# Patient Record
Sex: MALE | Race: WHITE | NOT HISPANIC OR LATINO | Employment: UNEMPLOYED | URBAN - METROPOLITAN AREA
[De-identification: names, ages, dates, MRNs, and addresses within clinical notes are randomized per-mention and may not be internally consistent; named-entity substitution may affect disease eponyms.]

---

## 2024-01-13 ENCOUNTER — APPOINTMENT (OUTPATIENT)
Dept: GENERAL RADIOLOGY | Facility: HOSPITAL | Age: 59
End: 2024-01-13
Payer: COMMERCIAL

## 2024-01-13 ENCOUNTER — HOSPITAL ENCOUNTER (EMERGENCY)
Facility: HOSPITAL | Age: 59
Discharge: HOME | End: 2024-01-13
Attending: EMERGENCY MEDICINE
Payer: COMMERCIAL

## 2024-01-13 VITALS
WEIGHT: 209.44 LBS | DIASTOLIC BLOOD PRESSURE: 73 MMHG | HEIGHT: 69 IN | SYSTOLIC BLOOD PRESSURE: 129 MMHG | BODY MASS INDEX: 31.02 KG/M2 | RESPIRATION RATE: 18 BRPM | TEMPERATURE: 98.2 F | OXYGEN SATURATION: 98 % | HEART RATE: 85 BPM

## 2024-01-13 DIAGNOSIS — R05.8 POST-VIRAL COUGH SYNDROME: ICD-10-CM

## 2024-01-13 DIAGNOSIS — H69.91 DYSFUNCTION OF RIGHT EUSTACHIAN TUBE: Primary | ICD-10-CM

## 2024-01-13 DIAGNOSIS — J02.9 SORE THROAT: ICD-10-CM

## 2024-01-13 LAB — S PYO AG THROAT QL: NEGATIVE

## 2024-01-13 PROCEDURE — 99283 EMERGENCY DEPT VISIT LOW MDM: CPT

## 2024-01-13 PROCEDURE — 71046 X-RAY EXAM CHEST 2 VIEWS: CPT

## 2024-01-13 PROCEDURE — 87081 CULTURE SCREEN ONLY: CPT | Performed by: REGISTERED NURSE

## 2024-01-13 PROCEDURE — 87880 STREP A ASSAY W/OPTIC: CPT | Performed by: REGISTERED NURSE

## 2024-01-13 RX ORDER — LISINOPRIL 20 MG/1
20 TABLET ORAL DAILY
COMMUNITY

## 2024-01-13 RX ORDER — BENZONATATE 100 MG/1
100 CAPSULE ORAL 3 TIMES DAILY PRN
Qty: 20 CAPSULE | Refills: 0 | Status: SHIPPED | OUTPATIENT
Start: 2024-01-13

## 2024-01-13 RX ORDER — PSEUDOEPHEDRINE HCL 120 MG/1
120 TABLET, FILM COATED, EXTENDED RELEASE ORAL EVERY 12 HOURS
Qty: 10 TABLET | Refills: 0 | Status: SHIPPED | OUTPATIENT
Start: 2024-01-13

## 2024-01-13 RX ORDER — IBUPROFEN 400 MG/1
800 TABLET ORAL ONCE
Status: COMPLETED | OUTPATIENT
Start: 2024-01-13 | End: 2024-01-13

## 2024-01-13 RX ORDER — HYDROCHLOROTHIAZIDE 25 MG/1
25 TABLET ORAL DAILY
COMMUNITY

## 2024-01-13 RX ORDER — BUPROPION HYDROCHLORIDE 150 MG/1
150 TABLET ORAL DAILY
COMMUNITY

## 2024-01-13 RX ADMIN — BENZOCAINE AND MENTHOL 1 EACH: 15; 3.6 LOZENGE ORAL at 22:12

## 2024-01-13 RX ADMIN — IBUPROFEN 800 MG: 400 TABLET, FILM COATED ORAL at 20:39

## 2024-01-14 NOTE — DISCHARGE INSTRUCTIONS
X-ray did not show any concerning findings today.  Your ear pain is likely due to eustachian tube dysfunction.  Take Sudafed as decongestant.  Take Tylenol Motrin as needed for pain.    You can take Tessalon Perles for your cough.  Drink plenty of fluids.  Mucinex may help with your congestion as well.    Return for any new concerns.

## 2024-01-14 NOTE — ED PROVIDER NOTES
Time: 7:57 PM EST  Date of encounter:  1/13/2024  Independent Historian/Clinical History and Information was obtained by:   Patient    History is limited by: N/A    Chief Complaint: Right ear pain, cough      History of Present Illness:  Patient is a 58 y.o. year old male who presents to the emergency department for evaluation of cough for over 2 weeks as well as right ear pain that started yesterday.  Patient reports that he was seen by his PCP in [place] 2 weeks ago and diagnosed with respiratory infection for which she was given antibiotics and steroids.  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  Patient states that he has right ear pain since yesterday.  He has been taken Tylenol and ibuprofen without significant relief of symptoms.  Patient denies nausea/vomiting, fever/chills.    HPI    Patient Care Team  Primary Care Provider: Marivel Carrington Known    Past Medical History:     No Known Allergies  Past Medical History:   Diagnosis Date    Depression     Hyperlipidemia     Hypertension      History reviewed. No pertinent surgical history.  History reviewed. No pertinent family history.    Home Medications:  Prior to Admission medications    Medication Sig Start Date End Date Taking? Authorizing Provider   buPROPion XL (WELLBUTRIN XL) 150 MG 24 hr tablet Take 1 tablet by mouth Daily.    Salas Carrington MD   Cariprazine HCl (Vraylar) 1.5 MG capsule capsule Take 1 capsule by mouth Daily.    Salas Carrington MD   hydroCHLOROthiazide (HYDRODIURIL) 25 MG tablet Take 1 tablet by mouth Daily.    Salas Carrington MD   lisinopril (PRINIVIL,ZESTRIL) 20 MG tablet Take 1 tablet by mouth Daily.    ProviderSalas MD        Social History:   Social History     Substance Use Topics    Alcohol use: Not Currently    Drug use: Not Currently         Review of Systems:  Review of Systems   Constitutional:  Negative for chills and fever.   HENT:  Positive for ear pain  "(Right). Negative for congestion and sore throat.    Eyes:  Negative for pain.   Respiratory:  Positive for cough. Negative for chest tightness and shortness of breath.    Cardiovascular:  Negative for chest pain.   Gastrointestinal:  Negative for abdominal pain, diarrhea, nausea and vomiting.   Genitourinary:  Negative for flank pain and hematuria.   Musculoskeletal:  Negative for joint swelling.   Skin:  Negative for pallor.   Neurological:  Negative for seizures and headaches.   All other systems reviewed and are negative.       Physical Exam:  /73   Pulse 85   Temp 98.2 °F (36.8 °C) (Oral)   Resp 18   Ht 175.3 cm (69\")   Wt 95 kg (209 lb 7 oz)   SpO2 98%   BMI 30.93 kg/m²     Physical Exam  Vitals and nursing note reviewed.   Constitutional:       General: He is in acute distress (Appears in pain).      Appearance: Normal appearance. He is not toxic-appearing.   HENT:      Head: Normocephalic and atraumatic.      Right Ear: Tympanic membrane, ear canal and external ear normal.      Left Ear: Tympanic membrane, ear canal and external ear normal.      Nose: Nose normal.      Mouth/Throat:      Lips: Pink.      Mouth: Mucous membranes are moist.      Pharynx: Oropharynx is clear. Uvula midline.   Eyes:      General: No scleral icterus.  Cardiovascular:      Rate and Rhythm: Normal rate and regular rhythm.      Pulses:           Radial pulses are 2+ on the right side and 2+ on the left side.      Heart sounds: Normal heart sounds.   Pulmonary:      Effort: Pulmonary effort is normal. No respiratory distress.      Breath sounds: Normal breath sounds. No wheezing or rhonchi.   Abdominal:      General: Abdomen is flat. Bowel sounds are normal.      Palpations: Abdomen is soft.      Tenderness: There is no abdominal tenderness.   Musculoskeletal:         General: Normal range of motion.      Cervical back: Normal range of motion and neck supple.   Skin:     General: Skin is warm and dry.   Neurological:     "  Mental Status: He is alert and oriented to person, place, and time. Mental status is at baseline.                Procedures:  Procedures      Medical Decision Making:      Comorbidities that affect care:    Substance Abuse    External Notes reviewed:          The following orders were placed and all results were independently analyzed by me:  Orders Placed This Encounter   Procedures    Rapid Strep A Screen - Swab, Throat    XR Chest 2 View       Medications Given in the Emergency Department:  Medications   ibuprofen (ADVIL,MOTRIN) tablet 800 mg (800 mg Oral Given 1/13/24 2039)   benzocaine-menthol (CEPACOL) lozenge 1 each (1 each Mouth/Throat Given 1/13/24 2212)        ED Course:    ED Course as of 01/13/24 2225   Sat Jan 13, 2024 2208 XR Chest 2 View  No acute changes [CW]      ED Course User Index  [CW] Mayra Chacon APRN       Labs:    Lab Results (last 24 hours)       Procedure Component Value Units Date/Time    Rapid Strep A Screen - Swab, Throat [[id number]] Collected: 01/13/24 2213    Specimen: Swab from Throat Updated: 01/13/24 2217             Imaging:    XR Chest 2 View    Result Date: 1/13/2024  PROCEDURE: XR CHEST 2 VW  COMPARISON: None.  INDICATIONS: COUGH; H/O UPPER RESPIRATORY INFECTION X 2 WEEKS; +EARACHE.  FINDINGS:  Two views of the chest reveal no cardiac enlargement and no acute infiltrate.  No pleural effusion or pneumothorax is seen.  There may be mild atelectasis and/or fibrosis in the lung bases, especially on the left.  Coronary artery calcifications are seen.  The thoracic aorta is atherosclerotic.  Degenerative changes involve the right shoulder.  There may be postoperative changes of the left shoulder.  Slightly low lung volumes are present.  There are degenerative changes of the imaged spine        No acute infiltrate is appreciated.    Please note that portions of this note were completed with a voice recognition program.  KATIE SALDANA JR, MD       Electronically Signed and  Approved By: KATIE SALDANA JR, MD on 1/13/2024 at 21:49                 Differential Diagnosis and Discussion:    Cough: Differential diagnosis includes but is not limited to pneumonia, acute bronchitis, upper respiratory infection, ACE inhibitor use, allergic reaction, epiglottitis, seasonal allergies, chemical irritants, exercise-induced asthma, viral syndrome.  Ear Pain: Differential diagnosis includes but is not limited to this externa, otitis media, foreign body, bullous myringitis, furuncles, herpes zoster, mastoiditis, trauma, and tumors    All X-rays impressions were independently interpreted by me.    MDM  Number of Diagnoses or Management Options  Dysfunction of right eustachian tube  Post-viral cough syndrome  Sore throat  Diagnosis management comments: Patient presented with cough and right ear pain.  Ear pain was improved after ibuprofen.  Prior to discharge patient complained of sore throat, will send for strep swab.  No acute findings on chest x-ray, likely postviral cough syndrome.  Patient was given Tessalon Perles for cough and Sudafed for right eustachian tube dysfunction. I do not believe that the patient has an acute emergency medical condition requiring additional emergency management at this time. The patient is currently stable for outpatient treatment and continuation of care. Important signs and symptoms that would warrant return to the emergency department were reviewed. The patient was provided the opportunity to ask questions. All questions were addressed and the patient was discharged from the ED. The patient demonstrated understanding and agreed to plan.         Amount and/or Complexity of Data Reviewed  Clinical lab tests: ordered  Tests in the radiology section of CPT®: reviewed and ordered    Risk of Complications, Morbidity, and/or Mortality  Presenting problems: low  Diagnostic procedures: minimal  Management options: minimal    Patient Progress  Patient progress: improved                  Patient Care Considerations:    ANTIBIOTICS: I considered prescribing antibiotics as an outpatient however no bacterial focus of infection was found.      Consultants/Shared Management Plan:        Social Determinants of Health:    Patient is independent, reliable, and has access to care.       Disposition and Care Coordination:    Discharged: The patient is suitable and stable for discharge with no need for consideration of observation or admission.    I have explained discharge medications and the need for follow up with the patient/caretakers. This was also printed in the discharge instructions. Patient was discharged with the following medications and follow up:      Medication List        New Prescriptions      benzonatate 100 MG capsule  Commonly known as: TESSALON  Take 1 capsule by mouth 3 (Three) Times a Day As Needed for Cough.     pseudoephedrine 120 MG 12 hr tablet  Commonly known as: Sudafed 12 Hour  Take 1 tablet by mouth Every 12 (Twelve) Hours.               Where to Get Your Medications        [facility] [address] [facility] - [phone]  - [phone] [place] [address] [facility] [address]      Phone: [phone]   benzonatate 100 MG capsule  pseudoephedrine 120 MG 12 hr tablet      No follow-up provider specified.     Final diagnoses:   Dysfunction of right eustachian tube   Post-viral cough syndrome   Sore throat        ED Disposition       ED Disposition   Discharge    Condition   Stable    Comment   --               This medical record created using voice recognition software.             Mayra Chacon, LORI  01/13/24 [id number]

## 2024-01-15 ENCOUNTER — APPOINTMENT (OUTPATIENT)
Dept: GENERAL RADIOLOGY | Facility: HOSPITAL | Age: 59
End: 2024-01-15
Payer: COMMERCIAL

## 2024-01-15 ENCOUNTER — HOSPITAL ENCOUNTER (EMERGENCY)
Facility: HOSPITAL | Age: 59
Discharge: HOME | End: 2024-01-16
Attending: EMERGENCY MEDICINE
Payer: COMMERCIAL

## 2024-01-15 ENCOUNTER — APPOINTMENT (OUTPATIENT)
Dept: CT IMAGING | Facility: HOSPITAL | Age: 59
End: 2024-01-15
Payer: COMMERCIAL

## 2024-01-15 ENCOUNTER — APPOINTMENT (OUTPATIENT)
Dept: MRI IMAGING | Facility: HOSPITAL | Age: 59
End: 2024-01-15
Payer: COMMERCIAL

## 2024-01-15 DIAGNOSIS — H65.91 RIGHT OTITIS MEDIA WITH EFFUSION: ICD-10-CM

## 2024-01-15 DIAGNOSIS — F41.1 ANXIETY REACTION: Primary | ICD-10-CM

## 2024-01-15 DIAGNOSIS — R20.2 PARESTHESIAS: ICD-10-CM

## 2024-01-15 DIAGNOSIS — R07.9 NONSPECIFIC CHEST PAIN: ICD-10-CM

## 2024-01-15 LAB
ABO GROUP BLD: NORMAL
ALBUMIN SERPL-MCNC: 3.8 G/DL (ref 3.5–5.2)
ALBUMIN/GLOB SERPL: 1.5 G/DL
ALP SERPL-CCNC: 103 U/L (ref 39–117)
ALT SERPL W P-5'-P-CCNC: 18 U/L (ref 1–41)
ANION GAP SERPL CALCULATED.3IONS-SCNC: 8.4 MMOL/L (ref 5–15)
AST SERPL-CCNC: 12 U/L (ref 1–40)
BACTERIA SPEC AEROBE CULT: NORMAL
BASOPHILS # BLD AUTO: 0.05 10*3/MM3 (ref 0–0.2)
BASOPHILS NFR BLD AUTO: 0.5 % (ref 0–1.5)
BILIRUB SERPL-MCNC: <0.2 MG/DL (ref 0–1.2)
BLD GP AB SCN SERPL QL: NEGATIVE
BUN SERPL-MCNC: 11 MG/DL (ref 6–20)
BUN/CREAT SERPL: 12.1 (ref 7–25)
CALCIUM SPEC-SCNC: 9.4 MG/DL (ref 8.6–10.5)
CHLORIDE SERPL-SCNC: 103 MMOL/L (ref 98–107)
CO2 SERPL-SCNC: 27.6 MMOL/L (ref 22–29)
CREAT SERPL-MCNC: 0.91 MG/DL (ref 0.76–1.27)
DEPRECATED RDW RBC AUTO: 44.2 FL (ref 37–54)
EGFRCR SERPLBLD CKD-EPI 2021: 98.3 ML/MIN/1.73
EOSINOPHIL # BLD AUTO: 0.21 10*3/MM3 (ref 0–0.4)
EOSINOPHIL NFR BLD AUTO: 2.1 % (ref 0.3–6.2)
ERYTHROCYTE [DISTWIDTH] IN BLOOD BY AUTOMATED COUNT: 13.6 % (ref 12.3–15.4)
GLOBULIN UR ELPH-MCNC: 2.5 GM/DL
GLUCOSE SERPL-MCNC: 157 MG/DL (ref 65–99)
HCT VFR BLD AUTO: 40.8 % (ref 37.5–51)
HGB BLD-MCNC: 13.6 G/DL (ref 13–17.7)
HOLD SPECIMEN: NORMAL
HOLD SPECIMEN: NORMAL
IMM GRANULOCYTES # BLD AUTO: 0.05 10*3/MM3 (ref 0–0.05)
IMM GRANULOCYTES NFR BLD AUTO: 0.5 % (ref 0–0.5)
INR PPP: 0.9 (ref 0.86–1.15)
LYMPHOCYTES # BLD AUTO: 3.55 10*3/MM3 (ref 0.7–3.1)
LYMPHOCYTES NFR BLD AUTO: 34.9 % (ref 19.6–45.3)
MCH RBC QN AUTO: 29.7 PG (ref 26.6–33)
MCHC RBC AUTO-ENTMCNC: 33.3 G/DL (ref 31.5–35.7)
MCV RBC AUTO: 89.1 FL (ref 79–97)
MONOCYTES # BLD AUTO: 0.7 10*3/MM3 (ref 0.1–0.9)
MONOCYTES NFR BLD AUTO: 6.9 % (ref 5–12)
NEUTROPHILS NFR BLD AUTO: 5.6 10*3/MM3 (ref 1.7–7)
NEUTROPHILS NFR BLD AUTO: 55.1 % (ref 42.7–76)
NRBC BLD AUTO-RTO: 0 /100 WBC (ref 0–0.2)
PLATELET # BLD AUTO: 345 10*3/MM3 (ref 140–450)
PMV BLD AUTO: 8.7 FL (ref 6–12)
POTASSIUM SERPL-SCNC: 3.5 MMOL/L (ref 3.5–5.2)
PROT SERPL-MCNC: 6.3 G/DL (ref 6–8.5)
PROTHROMBIN TIME: 12.3 SECONDS (ref 11.8–14.9)
RBC # BLD AUTO: 4.58 10*6/MM3 (ref 4.14–5.8)
RH BLD: POSITIVE
SODIUM SERPL-SCNC: 139 MMOL/L (ref 136–145)
T&S EXPIRATION DATE: NORMAL
TROPONIN T SERPL HS-MCNC: 19 NG/L
WBC NRBC COR # BLD AUTO: 10.16 10*3/MM3 (ref 3.4–10.8)
WHOLE BLOOD HOLD COAG: NORMAL
WHOLE BLOOD HOLD SPECIMEN: NORMAL

## 2024-01-15 PROCEDURE — 86901 BLOOD TYPING SEROLOGIC RH(D): CPT | Performed by: EMERGENCY MEDICINE

## 2024-01-15 PROCEDURE — 99284 EMERGENCY DEPT VISIT MOD MDM: CPT

## 2024-01-15 PROCEDURE — 93010 ELECTROCARDIOGRAM REPORT: CPT | Performed by: INTERNAL MEDICINE

## 2024-01-15 PROCEDURE — 84484 ASSAY OF TROPONIN QUANT: CPT | Performed by: EMERGENCY MEDICINE

## 2024-01-15 PROCEDURE — 70551 MRI BRAIN STEM W/O DYE: CPT

## 2024-01-15 PROCEDURE — 70450 CT HEAD/BRAIN W/O DYE: CPT

## 2024-01-15 PROCEDURE — 80053 COMPREHEN METABOLIC PANEL: CPT | Performed by: EMERGENCY MEDICINE

## 2024-01-15 PROCEDURE — 86900 BLOOD TYPING SEROLOGIC ABO: CPT | Performed by: EMERGENCY MEDICINE

## 2024-01-15 PROCEDURE — 85610 PROTHROMBIN TIME: CPT | Performed by: EMERGENCY MEDICINE

## 2024-01-15 PROCEDURE — 99285 EMERGENCY DEPT VISIT HI MDM: CPT

## 2024-01-15 PROCEDURE — 85025 COMPLETE CBC W/AUTO DIFF WBC: CPT | Performed by: EMERGENCY MEDICINE

## 2024-01-15 PROCEDURE — 99204 OFFICE O/P NEW MOD 45 MIN: CPT | Performed by: PSYCHIATRY & NEUROLOGY

## 2024-01-15 PROCEDURE — 93005 ELECTROCARDIOGRAM TRACING: CPT | Performed by: EMERGENCY MEDICINE

## 2024-01-15 PROCEDURE — 86850 RBC ANTIBODY SCREEN: CPT | Performed by: EMERGENCY MEDICINE

## 2024-01-15 PROCEDURE — 71045 X-RAY EXAM CHEST 1 VIEW: CPT

## 2024-01-15 RX ORDER — SODIUM CHLORIDE 0.9 % (FLUSH) 0.9 %
10 SYRINGE (ML) INJECTION AS NEEDED
Status: DISCONTINUED | OUTPATIENT
Start: 2024-01-15 | End: 2024-01-16 | Stop reason: HOSPADM

## 2024-01-16 VITALS
RESPIRATION RATE: 17 BRPM | HEIGHT: 69 IN | TEMPERATURE: 98.2 F | DIASTOLIC BLOOD PRESSURE: 84 MMHG | OXYGEN SATURATION: 99 % | SYSTOLIC BLOOD PRESSURE: 157 MMHG | BODY MASS INDEX: 25.6 KG/M2 | WEIGHT: 172.84 LBS | HEART RATE: 76 BPM

## 2024-01-16 RX ORDER — AMOXICILLIN 875 MG/1
875 TABLET, COATED ORAL 2 TIMES DAILY
Qty: 14 TABLET | Refills: 0 | Status: SHIPPED | OUTPATIENT
Start: 2024-01-16 | End: 2024-01-16 | Stop reason: SDUPTHER

## 2024-01-16 RX ORDER — AMOXICILLIN 875 MG/1
875 TABLET, COATED ORAL 2 TIMES DAILY
Qty: 14 TABLET | Refills: 0 | Status: SHIPPED | OUTPATIENT
Start: 2024-01-16

## 2024-01-16 NOTE — ED PROVIDER NOTES
Time: 8:27 PM EST  Date of encounter:  1/15/2024  Independent Historian/Clinical History and Information was obtained by:   Patient    History is limited by:  Vague historian    Chief Complaint: Chest pain, lightheadedness, left-sided numbness of the face and arm      History of Present Illness:  Patient is a 59 y.o. year old male who presents to the emergency department for evaluation of chest pain, left-sided numbness of the face and arm.  Patient has been lightheaded for approximately a week.  Complaining of left-sided chest pain with upper extremity numbness.  Left facial numbness.  Patient was from [facility].  Also complaining of anxiety.      HPI    Patient Care Team  Primary Care Provider: Provider, Marivel Known    Past Medical History:     No Known Allergies  Past Medical History:   Diagnosis Date    Depression     Hyperlipidemia     Hypertension      Past Surgical History:   Procedure Laterality Date    CARDIAC SURGERY      HEMORRHOIDECTOMY      SHOULDER ARTHROSCOPY Left      History reviewed. No pertinent family history.    Home Medications:  Prior to Admission medications    Medication Sig Start Date End Date Taking? Authorizing Provider   benzonatate (TESSALON) 100 MG capsule Take 1 capsule by mouth 3 (Three) Times a Day As Needed for Cough. 1/13/24   Mayra Chacon APRN   buPROPion XL (WELLBUTRIN XL) 150 MG 24 hr tablet Take 1 tablet by mouth Daily.    ProviderSalas MD   Cariprazine HCl (Vraylar) 1.5 MG capsule capsule Take 1 capsule by mouth Daily.    Salas Carrington MD   hydroCHLOROthiazide (HYDRODIURIL) 25 MG tablet Take 1 tablet by mouth Daily.    Salas Carrington MD   lisinopril (PRINIVIL,ZESTRIL) 20 MG tablet Take 1 tablet by mouth Daily.    Salas Carrington MD   pseudoephedrine (Sudafed 12 Hour) 120 MG 12 hr tablet Take 1 tablet by mouth Every 12 (Twelve) Hours. 1/13/24   Mayra Chacon APRN        Social History:   Social History     Tobacco Use    Smoking status:  "Every Day     Packs/day: 0.50     Years: 40.00     Additional pack years: 0.00     Total pack years: 20.00     Types: Cigarettes    Smokeless tobacco: Never   Substance Use Topics    Alcohol use: Not Currently    Drug use: Not Currently         Review of Systems:  Review of Systems   Constitutional:  Negative for chills and fever.   HENT:  Negative for congestion, rhinorrhea and sore throat.    Eyes:  Negative for photophobia.   Respiratory:  Negative for apnea, cough, chest tightness and shortness of breath.    Cardiovascular:  Positive for chest pain. Negative for palpitations.   Gastrointestinal:  Negative for abdominal pain, diarrhea, nausea and vomiting.   Endocrine: Negative.    Genitourinary:  Negative for difficulty urinating and dysuria.   Musculoskeletal:  Negative for back pain, joint swelling and myalgias.   Skin:  Negative for color change and wound.   Allergic/Immunologic: Negative.    Neurological:  Positive for light-headedness and numbness. Negative for seizures and headaches.   Psychiatric/Behavioral:  The patient is nervous/anxious.    All other systems reviewed and are negative.       Physical Exam:  /84   Pulse 76   Temp 98.2 °F (36.8 °C) (Oral)   Resp 17   Ht 175.3 cm (69\")   Wt 78.4 kg (172 lb 13.5 oz)   SpO2 99%   BMI 25.52 kg/m²     Physical Exam  Vitals and nursing note reviewed.   Constitutional:       General: He is awake.      Appearance: Normal appearance.   HENT:      Head: Normocephalic and atraumatic.      Nose: Nose normal.      Mouth/Throat:      Mouth: Mucous membranes are moist.   Eyes:      Extraocular Movements: Extraocular movements intact.      Pupils: Pupils are equal, round, and reactive to light.   Cardiovascular:      Rate and Rhythm: Normal rate and regular rhythm.      Heart sounds: Normal heart sounds.   Pulmonary:      Effort: Pulmonary effort is normal. No respiratory distress.      Breath sounds: Normal breath sounds. No wheezing, rhonchi or rales. "   Abdominal:      General: Bowel sounds are normal.      Palpations: Abdomen is soft.      Tenderness: There is no abdominal tenderness. There is no guarding or rebound.      Comments: No rigidity   Musculoskeletal:         General: No tenderness. Normal range of motion.      Cervical back: Normal range of motion and neck supple.   Skin:     General: Skin is warm and dry.      Coloration: Skin is not jaundiced.   Neurological:      General: No focal deficit present.      Mental Status: He is alert. Mental status is at baseline.   Psychiatric:      Comments: Moderately anxious                  Procedures:  Procedures      Medical Decision Making:      Comorbidities that affect care:    Hypertension, hyperlipidemia, depression, CVA    External Notes reviewed:    None      The following orders were placed and all results were independently analyzed by me:  Orders Placed This Encounter   Procedures    CT Head Without Contrast Stroke Protocol    XR Chest 1 View    MRI Brain Without Contrast    Comprehensive Metabolic Panel    Protime-INR    [place] Draw    CBC Auto Differential    Single High Sensitivity Troponin T    Undress and Gown    Continuous Pulse Oximetry    Vital Signs    Nursing Dysphagia Screening (Complete Prior to Giving anything PO)    RN to Place Order SLP Consult (IF swallow screen failed) - Eval & Treat Choosing Reason of RN Dysphagia Screen Failed    Please alert Dr. Mitchell when MRI report is available  Nursing Communication    POC Glucose Once    ECG 12 Lead ED Triage Standing Order; Stroke (Onset >12 hrs)    Type & Screen    CBC & Differential    Green Top (Gel)    Lavender Top    Gold Top - SST    Light Blue Top       Medications Given in the Emergency Department:  Medications - No data to display       ED Course:    ED Course as of 01/18/24 1541   Mon Tomasz 15, 2024   2241 I have personally interpreted the EKG today and it shows no evidence of any acute ischemia or heart arrhythmia.  Nonspecific  intraventricular conduction delay [RP]   2315 Evaluation: Patient's left jaw numbness has resolved.  His chest pain is reproducible with both palpation of the lateral left pectoralis and movement of the left upper extremity.  His primary request right now is to get something to eat and drink. [RP]   Tue Jan 16, 2024   0028 Patient's MRI came back negative for stroke and I gave him this information prior to discharge, but he was now having pain in his right ear with throbbing fullness and recent respiratory illness.    Eardrum shows no erythema but possibly some fluid behind the ear more of serous otitis media with effusion.    However MRI did show some age-indeterminate left maxillary sinusitis and also bilateral inflammation in the mastoid air cells so I will err on the side of caution and start him on some oral antibiotics and have him follow-up with his doctor. [VS]      ED Course User Index  [RP] Joe Ryan MD  [VS] Nura Mitchell MD       Labs:    Lab Results (last 24 hours)       ** No results found for the last 24 hours. **             Imaging:    No Radiology Exams Resulted Within Past 24 Hours      Differential Diagnosis and Discussion:    Chest Pain:  Based on the patient's signs and symptoms, I considered aortic dissection, myocardial infaction, pulmonary embolism, cardiac tamponade, pericarditis, pneumothorax, musculoskeletal chest pain and other differential diagnosis as an etiology of the patient's chest pain.   Psychiatric: Differential diagnosis includes but is not limited to depression, psychosis, bipolar disorder, anxiety, manic episode, schizophrenia, and substance abuse.  Stroke: Differential diagnosis in this patient with signs of possible ischemic stroke include TIA or ischemic stroke, hemorrhagic stroke, hypoglycemic episode, toxic or metabolic encephalopathy, paresthesias.    All labs were reviewed and interpreted by me.  All X-rays impressions were independently interpreted by  me.  EKG was interpreted by me.  CT scan radiology impression was interpreted by me.  MRI impression was interpreted by me.     MDM     Amount and/or Complexity of Data Reviewed  Clinical lab tests: reviewed  Tests in the radiology section of CPT®: reviewed  Tests in the medicine section of CPT®: reviewed                 Patient Care Considerations:    ANTIBIOTICS: I considered prescribing antibiotics as an outpatient however no bacterial focus of infection was found.      Consultants/Shared Management Plan:    Consultant: I have discussed the case with teleneurology on-call, Dr. Brunson who states she recommends cardiac workup.  Patient is highly anxious and hyperventilating on physical exam.  Recommends no further advanced imaging aside from MRI as needed.    Social Determinants of Health:    Patient is independent, reliable, and has access to care.       Disposition and Care Coordination:    Discharged: I considered escalation of care by admitting this patient to the hospital, however the patient has improved and is suitable and stable for discharge.        Final diagnoses:   Anxiety reaction   Nonspecific chest pain   Paresthesias   Right otitis media with effusion        ED Disposition       ED Disposition   Discharge    Condition   Stable    Comment   --               This medical record created using voice recognition software.             Joe Ryan MD  01/18/24 [id number]

## 2024-01-16 NOTE — CONSULTS
[facility]  [facility]      Patient Name:   Yossi Araya  YOB: 1965  Identification Number:   MRN - [id number]  Date of Service:   01/15/2024 20:03:41    Diagnosis:        R29.810 - Facial numbness/ Facial weakness        R51.9 - Headache, unspecified    Impression:       Pt with a h/o prior stroke with residual diplopia and HTN presents with one week lightheadedness, imbalance and 2 hours of headache with less than one hour left cheek numbness. NIHSS =1, no disabling symptoms. DDx includes anxiety, stroke, HTN.   Recommend medical/cardiac workup, resume ASA, routine MRI brain w/o.    Our recommendations are outlined below.    Recommendations:          Stroke/Telemetry Floor        Neuro Checks        Bedside Swallow Eval        DVT Prophylaxis        Euglycemia and Avoid Hyperthermia (PRN Acetaminophen)        Initiate or continue Aspirin 81 MG daily        Antihypertensives PRN if Blood pressure is greater than 220/120 or there is a concern for End organ damage/contraindications for permissive HTN. If blood pressure is greater than 220/120 give labetalol PO or IV or Vasotec IV with a goal of 15% reduction in BP during the first 24 hours.    Recommended Scan:       MRI Head Without Contrast   (CT head negative stroke still suspected)    Lipid Panel to Be Obtained, if Not Done in the Last 30 Days    Therapies:        Physical Therapy, Occupational Therapy, Speech Therapy Assessment When Applicable    Dysphagia:        Swallow Evaluation, Bedside        NPO Until Swallow Evaluation    DVT prophylaxis:        Choice of Primary Team    Disposition:        Neurology Follow Up Recommended    Sign Out:        Discussed with Emergency Department Provider        ------------------------------------------------------------------------------    Advanced Imaging:  Advanced Imaging Deferred because:    Non-disabling symptoms as verified by the patient; no cortical  signs so not consistent with LVO      Metrics:  Last Known Well: Unknown  [facility] Notification Time: 01/15/2024 20:02:40  Arrival Time: 01/15/2024 20:03:18  Stamp Time: 01/15/2024 20:03:41  Initial Response Time: 01/15/2024 20:08:11  Symptoms: headache, jaw numbness .  Initial patient interaction: 01/15/2024 20:13:27  NIHSS Assessment Completed: 01/15/2024 20:21:07  Patient is not a candidate for Thrombolytic.  Thrombolytic Medical Decision: 01/15/2024 20:21:13  Patient was not deemed candidate for Thrombolytic because of following reasons:  Last Well Known Above 4.5 Hours.    I personally Reviewed the CT Head and it Showed no acute process    Primary Provider Notified of Diagnostic Impression and Management Plan on: 01/15/2024 20:28:57        ------------------------------------------------------------------------------    History of Present Illness:  Patient is a 58 year old Male.    Patient was brought by EMS for symptoms of headache, jaw numbness .  The patient presents from Rehab due to left jaw numbness and chest pain. He was noted to be hyperventilating on arrival in the ER.  He has a h/o prior stroke and states this feels like his prior stroke 2018 with residual diplopia.  He endorses a headache.  He states BP has been high for a week and he has not been taking any of his medications over the past week.  He states he has been having dizziness and imbalance for a week. He has been feeling lightheaded, like he was going to pass out. He has been having headaches for the past two hours.  He began having left jaw numbness at 1940 but this has improved, though remains very slightly numb isolated to the left jaw region.       Past Medical History:       Hypertension       Stroke    Medications:    No Anticoagulant use   Antiplatelet use: Yes asa + plavix  Reviewed EMR for current medications    Allergies:   Reviewed    Social History:  Alcohol Use: Former    Family History:    There is no family history of  premature cerebrovascular disease pertinent to this consultation    ROS :  14 Points Review of Systems was performed and was negative except mentioned in HPI.    Past Surgical History:  There Is No Surgical History Contributory To Today’s Visit         Examination:  BP(129/73), Pulse(85),  1A: Level of Consciousness - Alert; keenly responsive + 0  1B: Ask Month and Age - Both Questions Right + 0  1C: Blink Eyes & Squeeze Hands - Performs Both Tasks + 0  2: Test Horizontal Extraocular Movements - Normal + 0  3: Test Visual Fields - No Visual Loss + 0  4: Test Facial Palsy (Use Grimace if Obtunded) - Normal symmetry + 0  5A: Test Left Arm Motor Drift - No Drift for 10 Seconds + 0  5B: Test Right Arm Motor Drift - No Drift for 10 Seconds + 0  6A: Test Left Leg Motor Drift - No Drift for 5 Seconds + 0  6B: Test Right Leg Motor Drift - No Drift for 5 Seconds + 0  7: Test Limb Ataxia (FNF/Heel-Shin) - No Ataxia + 0  8: Test Sensation - Normal; No sensory loss + 0  9: Test Language/Aphasia - Normal; No aphasia + 0  10: Test Dysarthria - Normal + 0  11: Test Extinction/Inattention - No abnormality + 0    NIHSS Score: 0    Pre-Morbid Modified Edd Scale:  0 Points = No symptoms at all    Spoke with : Dr. Ryan  I reviewed the available imaging via Rapid and initiated discussion with the primary provider    Patient/Family was informed the Neurology Consult would occur via TeleHealth consult by way of interactive audio and video telecommunications and consented to receiving care in this manner.      Patient is being evaluated for possible acute neurologic impairment and high probability of imminent or life-threatening deterioration. I spent total of 36 minutes providing care to this patient, including time for face to face visit via telemedicine, review of medical records, imaging studies and discussion of findings with providers, the patient and/or family.      Dr Abdoul Brunson      [facility]  For Inpatient  follow-up with [facility] physician please call [facility] [phone]. This is not an outpatient service. Post hospital discharge, please contact hospital directly.    Please do not communicate with [facility] physicians via secure chat. If you have any questions, Please contact [facility].  Please call or reconsult our service if there are any clinical or diagnostic changes.

## 2024-01-16 NOTE — ED NOTES
"Pt arrived via EMS from [facility]. Staff advised that pt developed left sided facial droop at 1940 this evening. Pt states that \"it feels like my last stroke.\" Pt also c/o left sided jaw pain as well as left sided chest pain.   "

## 2024-01-16 NOTE — DISCHARGE INSTRUCTIONS
Your workup today reveals no evidence of heart attack or stroke.  Seems most likely that you are having an anxiety reaction.  Call your primary care provider tomorrow for further testing.  Return to the ER as needed for worsening of symptoms.

## 2024-01-17 LAB
QT INTERVAL: 470 MS
QTC INTERVAL: 517 MS

## 2024-02-06 LAB — GLUCOSE BLDC GLUCOMTR-MCNC: 149 MG/DL (ref 70–99)
